# Patient Record
Sex: FEMALE
[De-identification: names, ages, dates, MRNs, and addresses within clinical notes are randomized per-mention and may not be internally consistent; named-entity substitution may affect disease eponyms.]

---

## 2018-01-09 ENCOUNTER — APPOINTMENT (OUTPATIENT)
Dept: OBGYN | Facility: CLINIC | Age: 38
End: 2018-01-09
Payer: COMMERCIAL

## 2018-01-09 VITALS
SYSTOLIC BLOOD PRESSURE: 154 MMHG | HEIGHT: 64 IN | BODY MASS INDEX: 41.66 KG/M2 | DIASTOLIC BLOOD PRESSURE: 86 MMHG | WEIGHT: 244 LBS

## 2018-01-09 DIAGNOSIS — N92.0 EXCESSIVE AND FREQUENT MENSTRUATION WITH REGULAR CYCLE: ICD-10-CM

## 2018-01-09 DIAGNOSIS — Z83.42 FAMILY HISTORY OF FAMILIAL HYPERCHOLESTEROLEMIA: ICD-10-CM

## 2018-01-09 DIAGNOSIS — Z83.3 FAMILY HISTORY OF DIABETES MELLITUS: ICD-10-CM

## 2018-01-09 PROCEDURE — 99385 PREV VISIT NEW AGE 18-39: CPT

## 2018-01-10 LAB — HPV HIGH+LOW RISK DNA PNL CVX: NOT DETECTED

## 2018-01-12 LAB — CYTOLOGY CVX/VAG DOC THIN PREP: NORMAL

## 2022-01-27 ENCOUNTER — APPOINTMENT (OUTPATIENT)
Dept: OBGYN | Facility: CLINIC | Age: 42
End: 2022-01-27
Payer: COMMERCIAL

## 2022-01-27 VITALS
WEIGHT: 242.13 LBS | SYSTOLIC BLOOD PRESSURE: 135 MMHG | DIASTOLIC BLOOD PRESSURE: 85 MMHG | BODY MASS INDEX: 41.34 KG/M2 | HEIGHT: 64 IN

## 2022-01-27 PROCEDURE — 99396 PREV VISIT EST AGE 40-64: CPT

## 2022-01-27 NOTE — HISTORY OF PRESENT ILLNESS
[Mammogramdate] : NEVER [PapSmeardate] : 2018 [Regular Cycle Intervals] : periods have been regular [Previously active] : previously active [FreeTextEntry2] : LAST ENCOUNTER >1YR AGO

## 2022-01-28 LAB — HPV HIGH+LOW RISK DNA PNL CVX: NOT DETECTED

## 2022-02-02 LAB — CYTOLOGY CVX/VAG DOC THIN PREP: NORMAL

## 2022-02-25 ENCOUNTER — NON-APPOINTMENT (OUTPATIENT)
Age: 42
End: 2022-02-25

## 2022-02-28 ENCOUNTER — APPOINTMENT (OUTPATIENT)
Dept: INTERNAL MEDICINE | Facility: CLINIC | Age: 42
End: 2022-02-28
Payer: COMMERCIAL

## 2022-02-28 VITALS
DIASTOLIC BLOOD PRESSURE: 90 MMHG | WEIGHT: 247 LBS | HEART RATE: 85 BPM | SYSTOLIC BLOOD PRESSURE: 140 MMHG | OXYGEN SATURATION: 98 % | BODY MASS INDEX: 42.17 KG/M2 | HEIGHT: 64 IN | RESPIRATION RATE: 14 BRPM

## 2022-02-28 DIAGNOSIS — R31.29 OTHER MICROSCOPIC HEMATURIA: ICD-10-CM

## 2022-02-28 DIAGNOSIS — Z82.49 FAMILY HISTORY OF ISCHEMIC HEART DISEASE AND OTHER DISEASES OF THE CIRCULATORY SYSTEM: ICD-10-CM

## 2022-02-28 DIAGNOSIS — Z78.9 OTHER SPECIFIED HEALTH STATUS: ICD-10-CM

## 2022-02-28 DIAGNOSIS — Z82.0 FAMILY HISTORY OF EPILEPSY AND OTHER DISEASES OF THE NERVOUS SYSTEM: ICD-10-CM

## 2022-02-28 DIAGNOSIS — M25.531 PAIN IN RIGHT WRIST: ICD-10-CM

## 2022-02-28 PROCEDURE — G0442 ANNUAL ALCOHOL SCREEN 15 MIN: CPT

## 2022-02-28 PROCEDURE — 99386 PREV VISIT NEW AGE 40-64: CPT | Mod: 25

## 2022-02-28 PROCEDURE — 90471 IMMUNIZATION ADMIN: CPT

## 2022-02-28 PROCEDURE — G0444 DEPRESSION SCREEN ANNUAL: CPT | Mod: 59

## 2022-02-28 PROCEDURE — 90715 TDAP VACCINE 7 YRS/> IM: CPT
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
## 2022-02-28 NOTE — HEALTH RISK ASSESSMENT
[Good] : ~his/her~  mood as  good [Never] : Never [Yes] : Yes [Monthly or less (1 pt)] : Monthly or less (1 point) [1 or 2 (0 pts)] : 1 or 2 (0 points) [No falls in past year] : Patient reported no falls in the past year [PHQ-9 Positive] : PHQ-9 Positive [I have developed a follow-up plan documented below in the note.] : I have developed a follow-up plan documented below in the note. [Audit-CScore] : 1 [de-identified] : Exercises regularly, has a Peloton at home [Patient reported PAP Smear was normal] : Patient reported PAP Smear was normal [Change in mental status noted] : No change in mental status noted [Language] : denies difficulty with language [Behavior] : denies difficulty with behavior [Learning/Retaining New Information] : denies difficulty learning/retaining new information [Handling Complex Tasks] : denies difficulty handling complex tasks [Reasoning] : denies difficulty with reasoning [Spatial Ability and Orientation] : denies difficulty with spatial ability and orientation [Employed] : employed [Single] : single [Sexually Active] : sexually active [Fully functional (bathing, dressing, toileting, transferring, walking, feeding)] : Fully functional (bathing, dressing, toileting, transferring, walking, feeding) [Fully functional (using the telephone, shopping, preparing meals, housekeeping, doing laundry, using] : Fully functional and needs no help or supervision to perform IADLs (using the telephone, shopping, preparing meals, housekeeping, doing laundry, using transportation, managing medications and managing finances) [Smoke Detector] : smoke detector [Carbon Monoxide Detector] : carbon monoxide detector [Guns at Home] : no guns at home [Seat Belt] :  uses seat belt [Sunscreen] : uses sunscreen [PapSmearDate] : 01/22 [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

## 2022-02-28 NOTE — ASSESSMENT
[FreeTextEntry1] : 1.  General health maintenance -Pap is up-to-date.  She has yet to schedule a mammogram and has a prescription through GYN.  Reminded that she should schedule this at her earliest convenience. SBIRT Screening - the patient was screened for alcohol, tobacco and substance abuse using standardized tools.  No high risk behavior was noted.  Depression screen was mildly positive but she is working with a therapist.  Needs a Tdap as her last was in 2010.  Had her COVID series with the booster but does not readily have the dates available.  Had a flu shot in October.  Uses sunscreen, seatbelts etc.\par 2.  History of microscopic hematuria -has been told it is a very small amount and has never really been worked up.  Will check a urine microscopy today.\par 3.  Increased weight -admits to stress eating.  Is interested in the medical weight management program which I referred her to.\par 4.  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  She does find this stressful as her mother is getting worse quickly.  Is working with her therapist as well.\par 5.  Labs as per plan\par 6.  Wrist pain which is mild in nature.  Recommend she try wearing the splint at night and can try Voltaren gel externally.\par 7.  Return to office pending above, 1 year for a CPE limitations in strength

## 2022-02-28 NOTE — PHYSICAL EXAM
[No Acute Distress] : no acute distress [Well Nourished] : well nourished [Well Developed] : well developed [Well-Appearing] : well-appearing [Normal Sclera/Conjunctiva] : normal sclera/conjunctiva [PERRL] : pupils equal round and reactive to light [EOMI] : extraocular movements intact [Normal Outer Ear/Nose] : the outer ears and nose were normal in appearance [No JVD] : no jugular venous distention [No Lymphadenopathy] : no lymphadenopathy [Supple] : supple [Thyroid Normal, No Nodules] : the thyroid was normal and there were no nodules present [No Respiratory Distress] : no respiratory distress  [No Accessory Muscle Use] : no accessory muscle use [Clear to Auscultation] : lungs were clear to auscultation bilaterally [Normal Rate] : normal rate  [Regular Rhythm] : with a regular rhythm [Normal S1, S2] : normal S1 and S2 [No Murmur] : no murmur heard [No Carotid Bruits] : no carotid bruits [No Abdominal Bruit] : a ~M bruit was not heard ~T in the abdomen [No Varicosities] : no varicosities [Pedal Pulses Present] : the pedal pulses are present [No Edema] : there was no peripheral edema [No Palpable Aorta] : no palpable aorta [No Extremity Clubbing/Cyanosis] : no extremity clubbing/cyanosis [Soft] : abdomen soft [Non Tender] : non-tender [Non-distended] : non-distended [No Masses] : no abdominal mass palpated [No HSM] : no HSM [Normal Bowel Sounds] : normal bowel sounds [Normal Posterior Cervical Nodes] : no posterior cervical lymphadenopathy [Normal Anterior Cervical Nodes] : no anterior cervical lymphadenopathy [No CVA Tenderness] : no CVA  tenderness [No Spinal Tenderness] : no spinal tenderness [No Joint Swelling] : no joint swelling [Grossly Normal Strength/Tone] : grossly normal strength/tone [No Rash] : no rash [Coordination Grossly Intact] : coordination grossly intact [No Focal Deficits] : no focal deficits [Normal Gait] : normal gait [Deep Tendon Reflexes (DTR)] : deep tendon reflexes were 2+ and symmetric [Normal Affect] : the affect was normal [Normal Insight/Judgement] : insight and judgment were intact [de-identified] : Deferred as just done at GYN

## 2022-03-08 LAB
25(OH)D3 SERPL-MCNC: 16.7 NG/ML
ALBUMIN SERPL ELPH-MCNC: 4.4 G/DL
ALP BLD-CCNC: 78 U/L
ALT SERPL-CCNC: 18 U/L
ANION GAP SERPL CALC-SCNC: 17 MMOL/L
APPEARANCE: ABNORMAL
APPEARANCE: ABNORMAL
AST SERPL-CCNC: 12 U/L
BACTERIA: ABNORMAL
BASOPHILS # BLD AUTO: 0.06 K/UL
BASOPHILS NFR BLD AUTO: 0.8 %
BILIRUB SERPL-MCNC: 0.2 MG/DL
BILIRUBIN URINE: NEGATIVE
BILIRUBIN URINE: NEGATIVE
BLOOD URINE: NORMAL
BLOOD URINE: NORMAL
BUN SERPL-MCNC: 10 MG/DL
CALCIUM SERPL-MCNC: 9.7 MG/DL
CHLORIDE SERPL-SCNC: 104 MMOL/L
CHOLEST SERPL-MCNC: 232 MG/DL
CO2 SERPL-SCNC: 20 MMOL/L
COLOR: NORMAL
COLOR: NORMAL
CREAT SERPL-MCNC: 0.7 MG/DL
EGFR: 111 ML/MIN/1.73M2
EOSINOPHIL # BLD AUTO: 0.12 K/UL
EOSINOPHIL NFR BLD AUTO: 1.5 %
ESTIMATED AVERAGE GLUCOSE: 114 MG/DL
GLUCOSE QUALITATIVE U: NEGATIVE
GLUCOSE QUALITATIVE U: NEGATIVE
GLUCOSE SERPL-MCNC: 110 MG/DL
HBA1C MFR BLD HPLC: 5.6 %
HCT VFR BLD CALC: 45.6 %
HDLC SERPL-MCNC: 56 MG/DL
HGB BLD-MCNC: 13.6 G/DL
HYALINE CASTS: 4 /LPF
IMM GRANULOCYTES NFR BLD AUTO: 0.3 %
KETONES URINE: NEGATIVE
KETONES URINE: NEGATIVE
LDLC SERPL CALC-MCNC: 146 MG/DL
LEUKOCYTE ESTERASE URINE: NEGATIVE
LEUKOCYTE ESTERASE URINE: NEGATIVE
LYMPHOCYTES # BLD AUTO: 1.65 K/UL
LYMPHOCYTES NFR BLD AUTO: 20.7 %
MAN DIFF?: NORMAL
MCHC RBC-ENTMCNC: 24.2 PG
MCHC RBC-ENTMCNC: 29.8 GM/DL
MCV RBC AUTO: 81 FL
MICROSCOPIC-UA: NORMAL
MONOCYTES # BLD AUTO: 0.52 K/UL
MONOCYTES NFR BLD AUTO: 6.5 %
NEUTROPHILS # BLD AUTO: 5.61 K/UL
NEUTROPHILS NFR BLD AUTO: 70.2 %
NITRITE URINE: NEGATIVE
NITRITE URINE: NEGATIVE
NONHDLC SERPL-MCNC: 176 MG/DL
PH URINE: 7
PH URINE: 7
PLATELET # BLD AUTO: 394 K/UL
POTASSIUM SERPL-SCNC: 4.6 MMOL/L
PROT SERPL-MCNC: 6.9 G/DL
PROTEIN URINE: NEGATIVE
PROTEIN URINE: NEGATIVE
RBC # BLD: 5.63 M/UL
RBC # FLD: 15.9 %
RED BLOOD CELLS URINE: 4 /HPF
SODIUM SERPL-SCNC: 142 MMOL/L
SPECIFIC GRAVITY URINE: 1.01
SPECIFIC GRAVITY URINE: 1.01
SQUAMOUS EPITHELIAL CELLS: 8 /HPF
T4 FREE SERPL-MCNC: 1.2 NG/DL
TRIGL SERPL-MCNC: 148 MG/DL
TSH SERPL-ACNC: 1.72 UIU/ML
UROBILINOGEN URINE: NORMAL
UROBILINOGEN URINE: NORMAL
WBC # FLD AUTO: 7.98 K/UL
WHITE BLOOD CELLS URINE: 4 /HPF

## 2022-04-06 ENCOUNTER — RESULT REVIEW (OUTPATIENT)
Age: 42
End: 2022-04-06

## 2022-04-06 ENCOUNTER — APPOINTMENT (OUTPATIENT)
Dept: MAMMOGRAPHY | Facility: CLINIC | Age: 42
End: 2022-04-06
Payer: COMMERCIAL

## 2022-04-06 PROCEDURE — 77067 SCR MAMMO BI INCL CAD: CPT

## 2022-04-06 PROCEDURE — 77063 BREAST TOMOSYNTHESIS BI: CPT

## 2023-04-03 ENCOUNTER — APPOINTMENT (OUTPATIENT)
Dept: INTERNAL MEDICINE | Facility: CLINIC | Age: 43
End: 2023-04-03
Payer: COMMERCIAL

## 2023-04-03 VITALS
DIASTOLIC BLOOD PRESSURE: 82 MMHG | BODY MASS INDEX: 44.9 KG/M2 | HEART RATE: 84 BPM | SYSTOLIC BLOOD PRESSURE: 122 MMHG | HEIGHT: 64 IN | OXYGEN SATURATION: 98 % | WEIGHT: 263 LBS

## 2023-04-03 DIAGNOSIS — Z63.6 DEPENDENT RELATIVE NEEDING CARE AT HOME: ICD-10-CM

## 2023-04-03 DIAGNOSIS — Z00.00 ENCOUNTER FOR GENERAL ADULT MEDICAL EXAMINATION W/OUT ABNORMAL FINDINGS: ICD-10-CM

## 2023-04-03 PROCEDURE — G0444 DEPRESSION SCREEN ANNUAL: CPT | Mod: 59

## 2023-04-03 PROCEDURE — 99396 PREV VISIT EST AGE 40-64: CPT

## 2023-04-03 PROCEDURE — G0442 ANNUAL ALCOHOL SCREEN 15 MIN: CPT

## 2023-04-03 RX ORDER — MULTIVIT-MIN/FOLIC/VIT K/LYCOP 400-300MCG
50 MCG TABLET ORAL
Qty: 90 | Refills: 0 | Status: ACTIVE | COMMUNITY
Start: 2023-04-03

## 2023-04-03 SDOH — SOCIAL STABILITY - SOCIAL INSECURITY: DEPENDENT RELATIVE NEEDING CARE AT HOME: Z63.6

## 2023-04-03 NOTE — PHYSICAL EXAM
[No Acute Distress] : no acute distress [Well Nourished] : well nourished [Well Developed] : well developed [Well-Appearing] : well-appearing [Normal Sclera/Conjunctiva] : normal sclera/conjunctiva [PERRL] : pupils equal round and reactive to light [EOMI] : extraocular movements intact [Normal Outer Ear/Nose] : the outer ears and nose were normal in appearance [No JVD] : no jugular venous distention [No Lymphadenopathy] : no lymphadenopathy [Supple] : supple [Thyroid Normal, No Nodules] : the thyroid was normal and there were no nodules present [No Respiratory Distress] : no respiratory distress  [No Accessory Muscle Use] : no accessory muscle use [Clear to Auscultation] : lungs were clear to auscultation bilaterally [Normal Rate] : normal rate  [Regular Rhythm] : with a regular rhythm [Normal S1, S2] : normal S1 and S2 [No Murmur] : no murmur heard [No Carotid Bruits] : no carotid bruits [No Abdominal Bruit] : a ~M bruit was not heard ~T in the abdomen [No Varicosities] : no varicosities [Pedal Pulses Present] : the pedal pulses are present [No Edema] : there was no peripheral edema [No Palpable Aorta] : no palpable aorta [No Extremity Clubbing/Cyanosis] : no extremity clubbing/cyanosis [Soft] : abdomen soft [Non Tender] : non-tender [Non-distended] : non-distended [No Masses] : no abdominal mass palpated [No HSM] : no HSM [Normal Bowel Sounds] : normal bowel sounds [No CVA Tenderness] : no CVA  tenderness [No Spinal Tenderness] : no spinal tenderness [No Joint Swelling] : no joint swelling [Grossly Normal Strength/Tone] : grossly normal strength/tone [No Rash] : no rash [Coordination Grossly Intact] : coordination grossly intact [No Focal Deficits] : no focal deficits [Normal Gait] : normal gait [Deep Tendon Reflexes (DTR)] : deep tendon reflexes were 2+ and symmetric [Normal Affect] : the affect was normal [Normal Insight/Judgement] : insight and judgment were intact [de-identified] : B/L cerumen occuding the TM's [de-identified] : Deferred as just done at GYN
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
## 2023-04-03 NOTE — HEALTH RISK ASSESSMENT
[Good] : ~his/her~  mood as  good [Yes] : Yes [Monthly or less (1 pt)] : Monthly or less (1 point) [1 or 2 (0 pts)] : 1 or 2 (0 points) [Employed] : employed [Single] : single [Sexually Active] : sexually active [Fully functional (bathing, dressing, toileting, transferring, walking, feeding)] : Fully functional (bathing, dressing, toileting, transferring, walking, feeding) [Fully functional (using the telephone, shopping, preparing meals, housekeeping, doing laundry, using] : Fully functional and needs no help or supervision to perform IADLs (using the telephone, shopping, preparing meals, housekeeping, doing laundry, using transportation, managing medications and managing finances) [Smoke Detector] : smoke detector [Carbon Monoxide Detector] : carbon monoxide detector [Seat Belt] :  uses seat belt [Sunscreen] : uses sunscreen [With Patient/Caregiver] : , with patient/caregiver [Several Days (1)] : 6.) Feeling bad about yourself, or that you are a failure, or have let yourself or your family down? Several days [Not at All (0)] : 8.) Moving or speaking so slowly that other people could have noticed, or the opposite, moving or speaking faster than usual? Not at all [Not at all] : How difficult have these problems made it for you to do your work, take care of things at home, or get along with people? Not at all [PHQ-9 Negative - No further assessment needed] : PHQ-9 Negative - No further assessment needed [Audit-CScore] : 1 [de-identified] : walking 2x/week [[id number]] : 4 [Change in mental status noted] : No change in mental status noted [Language] : denies difficulty with language [Behavior] : denies difficulty with behavior [Learning/Retaining New Information] : denies difficulty learning/retaining new information [Handling Complex Tasks] : denies difficulty handling complex tasks [Reasoning] : denies difficulty with reasoning [Spatial Ability and Orientation] : denies difficulty with spatial ability and orientation [Guns at Home] : no guns at home [MammogramDate] : 04/22 [MammogramComments] : Birads-1.  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

## 2023-04-03 NOTE — ASSESSMENT
[FreeTextEntry1] : 1.  General health maintenance -Pap and mammo are up-to-date.  SBIRT Screening - the patient was screened for alcohol, tobacco and substance abuse using standardized tools.  No high risk behavior was noted.  5 minutes were spent scoring and discussing.  PHQ-9 was administered - score 4.  5 minutes were spent administering, scoring and discussing needs.   TdaP is UTD.  Had her COVID series with the boosters but does not readily have the dates available.  Had a flu shot in October.  Uses sunscreen, seatbelts etc.\par 2.  History of microscopic hematuria -urine last year was reviewed and was negative.  No c/o hematuria at this time\par 3.  Increased weight -admits to stress eating.  Is interested in the medical weight management program which I referred her to.  To increase her exercise as well.\par 4.  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  She does find this stressful as her mother is getting worse quickly.  Referred to our behavioral health program.\par 5.  Labs as per plan\par 6.  Return to office 1 year for a CPE

## 2023-04-15 LAB
25(OH)D3 SERPL-MCNC: 29.2 NG/ML
ALBUMIN SERPL ELPH-MCNC: 4.3 G/DL
ALP BLD-CCNC: 89 U/L
ALT SERPL-CCNC: 27 U/L
ANION GAP SERPL CALC-SCNC: 13 MMOL/L
AST SERPL-CCNC: 14 U/L
BASOPHILS # BLD AUTO: 0.06 K/UL
BASOPHILS NFR BLD AUTO: 0.9 %
BILIRUB SERPL-MCNC: 0.2 MG/DL
BUN SERPL-MCNC: 16 MG/DL
CALCIUM SERPL-MCNC: 9.6 MG/DL
CHLORIDE SERPL-SCNC: 100 MMOL/L
CHOLEST SERPL-MCNC: 268 MG/DL
CO2 SERPL-SCNC: 26 MMOL/L
CREAT SERPL-MCNC: 0.71 MG/DL
EGFR: 109 ML/MIN/1.73M2
EOSINOPHIL # BLD AUTO: 0.25 K/UL
EOSINOPHIL NFR BLD AUTO: 3.6 %
ESTIMATED AVERAGE GLUCOSE: 123 MG/DL
GLUCOSE SERPL-MCNC: 120 MG/DL
HBA1C MFR BLD HPLC: 5.9 %
HCT VFR BLD CALC: 45.2 %
HDLC SERPL-MCNC: 54 MG/DL
HGB BLD-MCNC: 13.9 G/DL
IMM GRANULOCYTES NFR BLD AUTO: 0.4 %
LDLC SERPL CALC-MCNC: 179 MG/DL
LYMPHOCYTES # BLD AUTO: 1.86 K/UL
LYMPHOCYTES NFR BLD AUTO: 26.8 %
MAN DIFF?: NORMAL
MCHC RBC-ENTMCNC: 24.3 PG
MCHC RBC-ENTMCNC: 30.8 GM/DL
MCV RBC AUTO: 78.9 FL
MONOCYTES # BLD AUTO: 0.47 K/UL
MONOCYTES NFR BLD AUTO: 6.8 %
NEUTROPHILS # BLD AUTO: 4.28 K/UL
NEUTROPHILS NFR BLD AUTO: 61.5 %
NONHDLC SERPL-MCNC: 214 MG/DL
PLATELET # BLD AUTO: 411 K/UL
POTASSIUM SERPL-SCNC: 4.5 MMOL/L
PROT SERPL-MCNC: 6.9 G/DL
RBC # BLD: 5.73 M/UL
RBC # FLD: 16.2 %
SODIUM SERPL-SCNC: 139 MMOL/L
T4 FREE SERPL-MCNC: 1.1 NG/DL
TRIGL SERPL-MCNC: 178 MG/DL
TSH SERPL-ACNC: 2.48 UIU/ML
WBC # FLD AUTO: 6.95 K/UL

## 2023-04-18 ENCOUNTER — APPOINTMENT (OUTPATIENT)
Dept: OBGYN | Facility: CLINIC | Age: 43
End: 2023-04-18
Payer: COMMERCIAL

## 2023-04-18 VITALS
SYSTOLIC BLOOD PRESSURE: 145 MMHG | WEIGHT: 265.13 LBS | BODY MASS INDEX: 45.26 KG/M2 | HEIGHT: 64 IN | DIASTOLIC BLOOD PRESSURE: 78 MMHG

## 2023-04-18 PROCEDURE — 99396 PREV VISIT EST AGE 40-64: CPT

## 2023-05-11 ENCOUNTER — RESULT REVIEW (OUTPATIENT)
Age: 43
End: 2023-05-11

## 2023-05-11 ENCOUNTER — APPOINTMENT (OUTPATIENT)
Dept: MAMMOGRAPHY | Facility: CLINIC | Age: 43
End: 2023-05-11
Payer: COMMERCIAL

## 2023-05-11 PROCEDURE — 77067 SCR MAMMO BI INCL CAD: CPT

## 2023-05-11 PROCEDURE — 77063 BREAST TOMOSYNTHESIS BI: CPT

## 2023-10-18 ENCOUNTER — OUTPATIENT (OUTPATIENT)
Dept: OUTPATIENT SERVICES | Facility: HOSPITAL | Age: 43
LOS: 1 days | End: 2023-10-18
Payer: COMMERCIAL

## 2023-10-18 ENCOUNTER — APPOINTMENT (OUTPATIENT)
Dept: BARIATRICS/WEIGHT MGMT | Facility: CLINIC | Age: 43
End: 2023-10-18
Payer: COMMERCIAL

## 2023-10-18 VITALS — BODY MASS INDEX: 47.12 KG/M2 | HEIGHT: 64 IN | WEIGHT: 276 LBS

## 2023-10-18 DIAGNOSIS — Z13.39 ENCOUNTER FOR SCREENING EXAM FOR OTHER MENTAL HEALTH AND BEHAVIORAL DISORDERS: ICD-10-CM

## 2023-10-18 DIAGNOSIS — Z13.31 ENCOUNTER FOR SCREENING FOR DEPRESSION: ICD-10-CM

## 2023-10-18 DIAGNOSIS — E78.2 MIXED HYPERLIPIDEMIA: ICD-10-CM

## 2023-10-18 DIAGNOSIS — R63.2 POLYPHAGIA: ICD-10-CM

## 2023-10-18 DIAGNOSIS — I10 ESSENTIAL (PRIMARY) HYPERTENSION: ICD-10-CM

## 2023-10-18 DIAGNOSIS — R73.03 PREDIABETES: ICD-10-CM

## 2023-10-18 DIAGNOSIS — E66.01 MORBID (SEVERE) OBESITY DUE TO EXCESS CALORIES: ICD-10-CM

## 2023-10-18 DIAGNOSIS — Z23 ENCOUNTER FOR IMMUNIZATION: ICD-10-CM

## 2023-10-18 PROCEDURE — G0463: CPT

## 2023-10-18 PROCEDURE — 99205 OFFICE O/P NEW HI 60 MIN: CPT | Mod: 95

## 2023-11-01 ENCOUNTER — APPOINTMENT (OUTPATIENT)
Dept: BARIATRICS/WEIGHT MGMT | Facility: CLINIC | Age: 43
End: 2023-11-01
Payer: COMMERCIAL

## 2023-11-01 PROCEDURE — 90791 PSYCH DIAGNOSTIC EVALUATION: CPT | Mod: GT,95

## 2023-11-21 ENCOUNTER — APPOINTMENT (OUTPATIENT)
Dept: BARIATRICS/WEIGHT MGMT | Facility: CLINIC | Age: 43
End: 2023-11-21
Payer: COMMERCIAL

## 2023-11-21 PROCEDURE — 90832 PSYTX W PT 30 MINUTES: CPT | Mod: 95,GT

## 2023-12-14 ENCOUNTER — APPOINTMENT (OUTPATIENT)
Dept: BARIATRICS/WEIGHT MGMT | Facility: CLINIC | Age: 43
End: 2023-12-14
Payer: COMMERCIAL

## 2023-12-14 VITALS — BODY MASS INDEX: 45.49 KG/M2 | WEIGHT: 265 LBS

## 2023-12-14 DIAGNOSIS — E55.9 VITAMIN D DEFICIENCY, UNSPECIFIED: ICD-10-CM

## 2023-12-14 DIAGNOSIS — R63.2 POLYPHAGIA: ICD-10-CM

## 2023-12-14 PROCEDURE — 99214 OFFICE O/P EST MOD 30 MIN: CPT | Mod: 95

## 2023-12-14 RX ORDER — LIRAGLUTIDE 6 MG/ML
18 INJECTION, SOLUTION SUBCUTANEOUS DAILY
Qty: 4 | Refills: 0 | Status: DISCONTINUED | COMMUNITY
Start: 2023-10-18 | End: 2023-12-14

## 2023-12-14 RX ORDER — METFORMIN HYDROCHLORIDE 500 MG/1
500 TABLET, COATED ORAL
Qty: 180 | Refills: 0 | Status: ACTIVE | COMMUNITY
Start: 2023-10-18 | End: 1900-01-01

## 2023-12-14 NOTE — HISTORY OF PRESENT ILLNESS
[Often] : 4. Within the past 3 months, during your episodes of excessive overeating, how often did you continue eating even though you were not hungry? Often [Sometimes] : 6. Within the past 3 months, during your episodes of excessive overeating, how often did you feel disgusted with yourself or guilty afterward? Sometimes [Never or Rarely] : 7. Within the past 3 months, during your episodes of excessive overeating, how often did you make yourself vomit as a means to control your weight or shape? Never or Rarely [] : 2. Do you feel distressed about your episodes of excessive overeating?  No [FreeTextEntry1] : Bariatric surgery history: none Obesity co-morbidities: predm, hld Comorbidities improved or resolved: none Anti-obesity medications: metformin Obesity medication side effects: none  PATIENT WAS NOTIFIED THAT ANYTHING WE DISCUSSED IN OUR MEETING TODAY MAY BE REFLECTED IN WRITING IN THE VISIT NOTE WHICH WILL BE AVAILABLE TO OTHER MEDICAL PROVIDERS TO REVIEW AS PART OF ROUTINE PATIENT CARE.  PATIENT VERBALLY AGREED.   Ms. MILENA DYER is a 42 year year old female who presents for evaluation and treatment of Class 3 obesity.   Obesity related co-morbidities: predm, hld Father has DM.   Patient lives - alone.  caregiver for mom so is at Moms often.  all 7 nights spends w her Mom Employment status - marketing  Weight History: Lowest adult weight: 195 Highest adult weight: 276  interim: - has been working w Dr. Mcqueen on healthier eating habits, recognizing fullness and she has been doing better w that - she has lost 10# in past 2 months.  we are wanting to continue to focus on mental health and support her progress - she is confident she is working toward a healthier place mentally regarding eating habits - she would like to see if zepbound is available early next year.  Side effects reviewed, patient did not have further questions. - taking metformin, not significant negative side effects.  helping w sugar cravings  Has gained 10-20 pounds over the past year.  Obesity began in childhood - middle school.  Weight gain has occurred with: In high school, had significant weight gain .   In college, started to see therapist and RDN - lost 40-50 lbs.  Before covid, was active for her job, and was at a healthier weight, was doing Noom around 220#.   Mom diagnosed with alzheimers in Dec 2019.  post covid has had significant weight gain.  +emotional, boredom eating. h/o eating disorder - binge eating.  Always been athlete - did tennis, kickboxing, [employer] before covid  Past weight loss attempts include:  RDN visits, exercise, Noom. These have produced a maximum of 40-50 pounds of weight loss.   Anti-obesity medications in the past: none  Sleep: 8 hours. sleeps well. no snoring.   Has 2 regular meals a day.  breakfast, lunch  Diet history: wakes up at: 730am B: 830- coffee, small bagel w cream cheese and lox, or sandwich w prosciutto and cheese L: 12-1 cooks at home.  protein, w pasta or rice. or tuna fish sandwich.  Or chicken sandwich, or salad.  chicken and rice, or steak and rice.  with broccoli or spinach.  Or egg sandwich   snack - fruit - bananas or apples.  popcorn or pretzels.  likes veggies.   D: 530-6pm - repeat of lunch stuff.  sometimes take out.    snacks: throughout the day eating after dinner: no  overeating episodes: yes - 3x a week.  >1.5 year and a half.    Sodas/fast food/processed foods: used to order a lot but now has food at Moms and her place. these days 0-1 days a week.    Water intake per day: 6-8+ cup per day. enjoys water coffee 2-4 cups.  black.   tea 1 cup  Physical activity: Patient enjoys: walking Current physical activity: daily activities/walking  has peloton.  could do peloton. at her house, could do 10-15 minutes   In the past, talking to friends, read/write, focusing on work, peloton - helped w stress.

## 2023-12-14 NOTE — ASSESSMENT
[FreeTextEntry1] : 42 y.o F with Class 3 obesity, preDM, HLD, presents for weight management  - will send zepbound in early jan. Side effects reviewed, patient did not have further questions. - start metformin 500mg bid - saxenda out of stock - not interested in soham surg consult at this time, will re-assess in future - discussed incr veg at meals, high fiber carbs, and low sat fat protein choices - plant based - psychologist Dr. reynolds - h/o eating disorder, binge eating, caregiver stress - she wants to start doing peloton  f/u 6-8 weeks. - discussed transition to new provider

## 2023-12-21 ENCOUNTER — APPOINTMENT (OUTPATIENT)
Dept: BARIATRICS/WEIGHT MGMT | Facility: CLINIC | Age: 43
End: 2023-12-21
Payer: COMMERCIAL

## 2023-12-21 PROCEDURE — 90832 PSYTX W PT 30 MINUTES: CPT | Mod: GT,95

## 2023-12-21 NOTE — REASON FOR VISIT
[Follow-Up Visit] : a follow-up visit for [Morbid Obesity (BMI>40)] : morbid obesity (bmi>40) [Home] : at home, [unfilled] , at the time of the visit. [Other Location: e.g. Home (Enter Location, City,State)___] : at [unfilled] [Patient] : the patient [Referring By:  ___] : ~[address]~ was referred for psychological evaluation by Dr. VASQUEZ [de-identified] : for evaluation and tx of psyc sxs related to obesity [de-identified] : Confidentiality and its limitations were explained.

## 2023-12-21 NOTE — HISTORY OF PRESENT ILLNESS
[de-identified] : Pt's motivation for weight loss is to "feel healthy and comfortable."   Considered about developing T2DM given paternal hx.  Reports gaining 40lbs x past 2 yrs. Per pt, her highest weight was 280-290 during high school and her lowest weight was 185-190 lbs in her early 20s.  She denies having a specific goal weight.

## 2023-12-21 NOTE — DISCUSSION/SUMMARY
[Conventional grooming and hygiene] : Conventional grooming and hygiene [Calm, cooperative] : Calm, cooperative [No unusual behaviors, movements, etc.] : No unusual behaviors, movements, etc. [Normal Rate/Tone/Volume] : Normal Rate/Tone/Volume [Normal Range] : Normal Range [Euthymic] : Euthymic [Logical, Goal Directed] : Logical, Goal Directed [Behavior plan developed] : Behavior plan developed [Addtnl Health & Behavior Intervention: Individual] : Additional Health and Behavior Intervention: Individual [Addtnl Health & Behavior Intervention: Group] : Additional Health and Behavior Intervention: Group [Coping skills training to overcome social/emotional barriers to adherence] : Coping skills training to overcome social/emotional barriers to adherence [de-identified] : good [de-identified] : good [de-identified] : emotional and mindless eating [FreeTextEntry1] : Zulema is a 42 yr old female referred by Dr. Capellan for behavioral weight loss counseling.  She endorses emotional and mindless eating with overeating episodes that do not meet criteria for binge eating due to quantity of food; however, she endorses loss of control, eating rapidly, eating when not physically hungry and eating until uncomfortably full.  Previously in therapy with Josiane Enrique LCSW for tx of disordered eating.  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   [de-identified] : UFED   [de-identified] : self monitor food intake and contextual factors increase awareness of triggers for overeating

## 2024-01-29 ENCOUNTER — APPOINTMENT (OUTPATIENT)
Dept: BARIATRICS/WEIGHT MGMT | Facility: CLINIC | Age: 44
End: 2024-01-29
Payer: COMMERCIAL

## 2024-01-29 VITALS — BODY MASS INDEX: 46.78 KG/M2 | HEIGHT: 64 IN | WEIGHT: 274 LBS

## 2024-01-29 PROCEDURE — 90832 PSYTX W PT 30 MINUTES: CPT | Mod: GT,95

## 2024-01-29 NOTE — DISCUSSION/SUMMARY
[Conventional grooming and hygiene] : Conventional grooming and hygiene [Calm, cooperative] : Calm, cooperative [No unusual behaviors, movements, etc.] : No unusual behaviors, movements, etc. [Normal Rate/Tone/Volume] : Normal Rate/Tone/Volume [Normal Range] : Normal Range [Euthymic] : Euthymic [Logical, Goal Directed] : Logical, Goal Directed [Behavior plan developed] : Behavior plan developed [Addtnl Health & Behavior Intervention: Individual] : Additional Health and Behavior Intervention: Individual [Addtnl Health & Behavior Intervention: Group] : Additional Health and Behavior Intervention: Group [Coping skills training to overcome social/emotional barriers to adherence] : Coping skills training to overcome social/emotional barriers to adherence [de-identified] : good [de-identified] : poor while traveling  [de-identified] : emotional and mindless eating [FreeTextEntry1] : Zulema is a 42 yr old female referred by Dr. Capellan for behavioral weight loss counseling.  She endorses emotional and mindless eating with overeating episodes that do not meet criteria for binge eating due to quantity of food; however, she endorses loss of control, eating rapidly, eating when not physically hungry and eating until uncomfortably full.  Previously in therapy with Josiane Enrique LCSW for tx of disordered eating.  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   [de-identified] : UFED   [de-identified] : self monitor food intake and contextual factors increase awareness of triggers for overeating

## 2024-01-29 NOTE — HISTORY OF PRESENT ILLNESS
[de-identified] : Pt's motivation for weight loss is to "feel healthy and comfortable."   Considered about developing T2DM given paternal hx.  Reports gaining 40lbs x past 2 yrs. Per pt, her highest weight was 280-290 during high school and her lowest weight was 185-190 lbs in her early 20s.  She denies having a specific goal weight.

## 2024-02-22 ENCOUNTER — APPOINTMENT (OUTPATIENT)
Dept: BARIATRICS/WEIGHT MGMT | Facility: CLINIC | Age: 44
End: 2024-02-22
Payer: COMMERCIAL

## 2024-02-22 VITALS — HEIGHT: 64 IN | WEIGHT: 270 LBS | BODY MASS INDEX: 46.1 KG/M2

## 2024-02-22 DIAGNOSIS — E78.2 MIXED HYPERLIPIDEMIA: ICD-10-CM

## 2024-02-22 DIAGNOSIS — E66.01 MORBID (SEVERE) OBESITY DUE TO EXCESS CALORIES: ICD-10-CM

## 2024-02-22 DIAGNOSIS — R73.03 PREDIABETES.: ICD-10-CM

## 2024-02-22 PROCEDURE — 99213 OFFICE O/P EST LOW 20 MIN: CPT | Mod: 95

## 2024-02-22 NOTE — HISTORY OF PRESENT ILLNESS
[FreeTextEntry1] : This is a 43 year old female  being seen obesity followup visit.   Patient is down 4 pounds  Taking metformin 1000mg, has not started zepbound yet. 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

## 2024-02-22 NOTE — ASSESSMENT
[FreeTextEntry1] : Bariatric surgery history: none Obesity co-morbidities: predm, hld Comorbidities improved or resolved: none Anti-obesity medications: metformin Obesity medication side effects: none  Plan: Focus on plant based, whole foods avoid package processed foods keep food journal  will send zepbound script to [facility] so she can get coupon applied  encouraged increase physical activity  f/u 4-6 weeks

## 2024-02-27 ENCOUNTER — APPOINTMENT (OUTPATIENT)
Dept: BARIATRICS/WEIGHT MGMT | Facility: CLINIC | Age: 44
End: 2024-02-27
Payer: COMMERCIAL

## 2024-02-27 PROCEDURE — 90832 PSYTX W PT 30 MINUTES: CPT | Mod: 95,GT

## 2024-02-27 NOTE — REASON FOR VISIT
[Follow-Up Visit] : a follow-up visit for [Morbid Obesity (BMI>40)] : morbid obesity (bmi>40) [Home] : at home, [unfilled] , at the time of the visit. [Other Location: e.g. Home (Enter Location, City,State)___] : at [unfilled] [Patient] : the patient [Referring By:  ___] : ~[address]~ was referred for psychological evaluation by Dr. VASQUEZ [de-identified] : for evaluation and tx of psyc sxs related to obesity [de-identified] : Confidentiality and its limitations were explained.

## 2024-02-27 NOTE — DISCUSSION/SUMMARY
RN called patient again to f/u on reported symptoms. Patient did not answer and vm left with request for call back. RN to follow in the am   [Conventional grooming and hygiene] : Conventional grooming and hygiene [Calm, cooperative] : Calm, cooperative [No unusual behaviors, movements, etc.] : No unusual behaviors, movements, etc. [Normal Rate/Tone/Volume] : Normal Rate/Tone/Volume [Normal Range] : Normal Range [Euthymic] : Euthymic [Logical, Goal Directed] : Logical, Goal Directed [de-identified] : good [de-identified] : good [de-identified] : emotional and mindless eating [Behavior plan developed] : Behavior plan developed [Addtnl Health & Behavior Intervention: Individual] : Additional Health and Behavior Intervention: Individual [Addtnl Health & Behavior Intervention: Group] : Additional Health and Behavior Intervention: Group [Coping skills training to overcome social/emotional barriers to adherence] : Coping skills training to overcome social/emotional barriers to adherence [FreeTextEntry1] : Zulema is a 42 yr old female referred by Dr. Capellan for behavioral weight loss counseling.  She endorses emotional and mindless eating with overeating episodes that do not meet criteria for binge eating due to quantity of food; however, she endorses loss of control, eating rapidly, eating when not physically hungry and eating until uncomfortably full.  Previously in therapy with Josiane Enrique LCSW for tx of disordered eating.  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   [de-identified] : UFED   [de-identified] : self monitor food intake and contextual factors increase awareness of triggers for overeating

## 2024-02-27 NOTE — HISTORY OF PRESENT ILLNESS
[de-identified] : Pt's motivation for weight loss is to "feel healthy and comfortable."   Considered about developing T2DM given paternal hx.  Reports gaining 40lbs x past 2 yrs. Per pt, her highest weight was 280-290 during high school and her lowest weight was 185-190 lbs in her early 20s.  She denies having a specific goal weight.

## 2024-03-27 ENCOUNTER — APPOINTMENT (OUTPATIENT)
Dept: BARIATRICS/WEIGHT MGMT | Facility: CLINIC | Age: 44
End: 2024-03-27
Payer: COMMERCIAL

## 2024-03-27 VITALS — BODY MASS INDEX: 45.41 KG/M2 | WEIGHT: 266 LBS | HEIGHT: 64 IN

## 2024-03-27 PROCEDURE — 90832 PSYTX W PT 30 MINUTES: CPT | Mod: 95,GT

## 2024-03-27 NOTE — DISCUSSION/SUMMARY
[Conventional grooming and hygiene] : Conventional grooming and hygiene [Calm, cooperative] : Calm, cooperative [No unusual behaviors, movements, etc.] : No unusual behaviors, movements, etc. [Normal Rate/Tone/Volume] : Normal Rate/Tone/Volume [Normal Range] : Normal Range [Euthymic] : Euthymic [Logical, Goal Directed] : Logical, Goal Directed [Behavior plan developed] : Behavior plan developed [Addtnl Health & Behavior Intervention: Individual] : Additional Health and Behavior Intervention: Individual [Addtnl Health & Behavior Intervention: Group] : Additional Health and Behavior Intervention: Group [Coping skills training to overcome social/emotional barriers to adherence] : Coping skills training to overcome social/emotional barriers to adherence [de-identified] : good [de-identified] : excellent [de-identified] : hx of emotional and mindless eating [FreeTextEntry1] : Zulema is a 42 yr old female referred by Dr. Capellan for behavioral weight loss counseling.  She endorses emotional and mindless eating with overeating episodes that do not meet criteria for binge eating due to quantity of food; however, she endorses loss of control, eating rapidly, eating when not physically hungry and eating until uncomfortably full.  Previously in therapy with Josiane Enrique LCSW for tx of disordered eating.  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   [de-identified] : UFED   [de-identified] : self monitor food intake and contextual factors increase awareness of triggers for overeating

## 2024-03-27 NOTE — REASON FOR VISIT
[Follow-Up Visit] : a follow-up visit for [Morbid Obesity (BMI>40)] : morbid obesity (bmi>40) [Home] : at home, [unfilled] , at the time of the visit. [Other Location: e.g. Home (Enter Location, City,State)___] : at [unfilled] [Patient] : the patient [Referring By:  ___] : ~[address]~ was referred for psychological evaluation by Dr. VASQUEZ [de-identified] : for evaluation and tx of psyc sxs related to obesity [de-identified] : Confidentiality and its limitations were explained.

## 2024-03-27 NOTE — HISTORY OF PRESENT ILLNESS
[de-identified] : Pt's motivation for weight loss is to "feel healthy and comfortable."   Considered about developing T2DM given paternal hx.  Reports gaining 40lbs x past 2 yrs. Per pt, her highest weight was 280-290 during high school and her lowest weight was 185-190 lbs in her early 20s.  She denies having a specific goal weight.

## 2024-04-09 ENCOUNTER — OUTPATIENT (OUTPATIENT)
Dept: OUTPATIENT SERVICES | Facility: HOSPITAL | Age: 44
LOS: 1 days | End: 2024-04-09
Payer: COMMERCIAL

## 2024-04-09 ENCOUNTER — APPOINTMENT (OUTPATIENT)
Dept: BARIATRICS/WEIGHT MGMT | Facility: CLINIC | Age: 44
End: 2024-04-09
Payer: COMMERCIAL

## 2024-04-09 VITALS — HEIGHT: 64 IN | BODY MASS INDEX: 45.75 KG/M2 | WEIGHT: 268 LBS

## 2024-04-09 DIAGNOSIS — I10 ESSENTIAL (PRIMARY) HYPERTENSION: ICD-10-CM

## 2024-04-09 PROCEDURE — 99213 OFFICE O/P EST LOW 20 MIN: CPT | Mod: 95

## 2024-04-09 PROCEDURE — G0463: CPT

## 2024-04-09 NOTE — ASSESSMENT
[FreeTextEntry1] : Bariatric surgery history: none Obesity co-morbidities: predm, hld Comorbidities improved or resolved: none Anti-obesity medications: metformin Obesity medication side effects: none  Plan: continue current eating plan  prepping in advance  recommend food journal  continue zepbound 2.5mg for now. when higher doses are available could increase dose  encouraged increase physical activity f/u with dr Mcqueen   f/u 4-6 weeks

## 2024-04-09 NOTE — HISTORY OF PRESENT ILLNESS
[FreeTextEntry1] : This is a 43 year old female  being seen obesity followup visit.   Patient is down 4 pounds  Taking metformin 1000mg,  She  started zepbound 2.5mg - tolerating well. 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

## 2024-04-15 DIAGNOSIS — E66.01 MORBID (SEVERE) OBESITY DUE TO EXCESS CALORIES: ICD-10-CM

## 2024-04-15 DIAGNOSIS — E78.2 MIXED HYPERLIPIDEMIA: ICD-10-CM

## 2024-04-15 DIAGNOSIS — R73.03 PREDIABETES: ICD-10-CM

## 2024-04-19 ENCOUNTER — APPOINTMENT (OUTPATIENT)
Dept: OBGYN | Facility: CLINIC | Age: 44
End: 2024-04-19
Payer: COMMERCIAL

## 2024-04-19 VITALS
BODY MASS INDEX: 46.18 KG/M2 | SYSTOLIC BLOOD PRESSURE: 134 MMHG | WEIGHT: 270.5 LBS | DIASTOLIC BLOOD PRESSURE: 86 MMHG | HEIGHT: 64 IN

## 2024-04-19 DIAGNOSIS — Z01.419 ENCOUNTER FOR GYNECOLOGICAL EXAMINATION (GENERAL) (ROUTINE) W/OUT ABNORMAL FINDINGS: ICD-10-CM

## 2024-04-19 PROCEDURE — 99396 PREV VISIT EST AGE 40-64: CPT

## 2024-04-24 LAB
25(OH)D3 SERPL-MCNC: 21.3 NG/ML
ALBUMIN SERPL ELPH-MCNC: 4.2 G/DL
ALP BLD-CCNC: 95 U/L
ALT SERPL-CCNC: 28 U/L
ANION GAP SERPL CALC-SCNC: 13 MMOL/L
AST SERPL-CCNC: 15 U/L
BILIRUB SERPL-MCNC: 0.2 MG/DL
BUN SERPL-MCNC: 13 MG/DL
CALCIUM SERPL-MCNC: 9.6 MG/DL
CHLORIDE SERPL-SCNC: 103 MMOL/L
CHOLEST SERPL-MCNC: 254 MG/DL
CO2 SERPL-SCNC: 23 MMOL/L
CREAT SERPL-MCNC: 0.75 MG/DL
CRP SERPL HS-MCNC: 27.37 MG/L
EGFR: 101 ML/MIN/1.73M2
ESTIMATED AVERAGE GLUCOSE: 120 MG/DL
GLUCOSE SERPL-MCNC: 115 MG/DL
HBA1C MFR BLD HPLC: 5.8 %
HDLC SERPL-MCNC: 45 MG/DL
INSULIN P FAST SERPL-ACNC: 44.2 UU/ML
LDLC SERPL CALC-MCNC: 181 MG/DL
NONHDLC SERPL-MCNC: 210 MG/DL
POTASSIUM SERPL-SCNC: 4.4 MMOL/L
PROT SERPL-MCNC: 6.8 G/DL
SODIUM SERPL-SCNC: 139 MMOL/L
T3FREE SERPL-MCNC: 3.35 PG/ML
T4 FREE SERPL-MCNC: 1.1 NG/DL
TRIGL SERPL-MCNC: 157 MG/DL
TSH SERPL-ACNC: 2.86 UIU/ML

## 2024-04-24 RX ORDER — TIRZEPATIDE 2.5 MG/.5ML
2.5 INJECTION, SOLUTION SUBCUTANEOUS
Qty: 4 | Refills: 5 | Status: ACTIVE | COMMUNITY
Start: 2023-12-14 | End: 1900-01-01

## 2024-05-09 ENCOUNTER — APPOINTMENT (OUTPATIENT)
Dept: BARIATRICS/WEIGHT MGMT | Facility: CLINIC | Age: 44
End: 2024-05-09
Payer: COMMERCIAL

## 2024-05-09 VITALS — WEIGHT: 264 LBS | BODY MASS INDEX: 45.07 KG/M2 | HEIGHT: 64 IN

## 2024-05-09 PROCEDURE — 90832 PSYTX W PT 30 MINUTES: CPT | Mod: GT,95

## 2024-05-09 NOTE — DISCUSSION/SUMMARY
[Conventional grooming and hygiene] : Conventional grooming and hygiene [Calm, cooperative] : Calm, cooperative [No unusual behaviors, movements, etc.] : No unusual behaviors, movements, etc. [Normal Rate/Tone/Volume] : Normal Rate/Tone/Volume [Normal Range] : Normal Range [Euthymic] : Euthymic [Logical, Goal Directed] : Logical, Goal Directed [Behavior plan developed] : Behavior plan developed [Addtnl Health & Behavior Intervention: Individual] : Additional Health and Behavior Intervention: Individual [Addtnl Health & Behavior Intervention: Group] : Additional Health and Behavior Intervention: Group [Coping skills training to overcome social/emotional barriers to adherence] : Coping skills training to overcome social/emotional barriers to adherence [de-identified] : good [de-identified] : excellent [de-identified] : hx of emotional and mindless eating [FreeTextEntry1] : Zulema is a 42 yr old female referred by Dr. Capellan for behavioral weight loss counseling.  She endorses emotional and mindless eating with overeating episodes that do not meet criteria for binge eating due to quantity of food; however, she endorses loss of control, eating rapidly, eating when not physically hungry and eating until uncomfortably full.  Previously in therapy with Josiane Enrqiue LCSW for tx of disordered eating.  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   [de-identified] : UFED   [de-identified] : self monitor food intake and contextual factors increase awareness of triggers for overeating

## 2024-05-09 NOTE — REASON FOR VISIT
[Follow-Up Visit] : a follow-up visit for [Morbid Obesity (BMI>40)] : morbid obesity (bmi>40) [Home] : at home, [unfilled] , at the time of the visit. [Other Location: e.g. Home (Enter Location, City,State)___] : at [unfilled] [Patient] : the patient [Referring By:  ___] : ~[address]~ was referred for psychological evaluation by Dr. VASQUEZ [de-identified] : for evaluation and tx of psyc sxs related to obesity [de-identified] : Confidentiality and its limitations were explained.

## 2024-05-09 NOTE — HISTORY OF PRESENT ILLNESS
[de-identified] : Pt's motivation for weight loss is to "feel healthy and comfortable."   Concerned about developing T2DM given paternal hx.  Reports gaining 40lbs x past 2 yrs. Per pt, her highest weight was 280-290 during high school and her lowest weight was 185-190 lbs in her early 20s.  She denies having a specific goal weight.

## 2024-06-06 ENCOUNTER — APPOINTMENT (OUTPATIENT)
Dept: MAMMOGRAPHY | Facility: IMAGING CENTER | Age: 44
End: 2024-06-06
Payer: COMMERCIAL

## 2024-06-06 ENCOUNTER — OUTPATIENT (OUTPATIENT)
Dept: OUTPATIENT SERVICES | Facility: HOSPITAL | Age: 44
LOS: 1 days | End: 2024-06-06
Payer: COMMERCIAL

## 2024-06-06 ENCOUNTER — RESULT REVIEW (OUTPATIENT)
Age: 44
End: 2024-06-06

## 2024-06-06 DIAGNOSIS — Z01.419 ENCOUNTER FOR GYNECOLOGICAL EXAMINATION (GENERAL) (ROUTINE) WITHOUT ABNORMAL FINDINGS: ICD-10-CM

## 2024-06-06 PROCEDURE — 77063 BREAST TOMOSYNTHESIS BI: CPT

## 2024-06-06 PROCEDURE — 77063 BREAST TOMOSYNTHESIS BI: CPT | Mod: 26

## 2024-06-06 PROCEDURE — 77067 SCR MAMMO BI INCL CAD: CPT | Mod: 26

## 2024-06-06 PROCEDURE — 77067 SCR MAMMO BI INCL CAD: CPT

## 2024-06-10 ENCOUNTER — RESULT REVIEW (OUTPATIENT)
Age: 44
End: 2024-06-10

## 2024-06-10 DIAGNOSIS — R92.2 INCONCLUSIVE MAMMOGRAM: ICD-10-CM

## 2024-06-11 ENCOUNTER — OUTPATIENT (OUTPATIENT)
Dept: OUTPATIENT SERVICES | Facility: HOSPITAL | Age: 44
LOS: 1 days | End: 2024-06-11
Payer: COMMERCIAL

## 2024-06-11 ENCOUNTER — APPOINTMENT (OUTPATIENT)
Dept: BARIATRICS/WEIGHT MGMT | Facility: CLINIC | Age: 44
End: 2024-06-11
Payer: COMMERCIAL

## 2024-06-11 VITALS — BODY MASS INDEX: 44.9 KG/M2 | HEIGHT: 64 IN | WEIGHT: 263 LBS

## 2024-06-11 DIAGNOSIS — I10 ESSENTIAL (PRIMARY) HYPERTENSION: ICD-10-CM

## 2024-06-11 PROCEDURE — G0463: CPT

## 2024-06-11 PROCEDURE — 99213 OFFICE O/P EST LOW 20 MIN: CPT | Mod: 95

## 2024-06-11 RX ORDER — TIRZEPATIDE 5 MG/.5ML
5 INJECTION, SOLUTION SUBCUTANEOUS
Qty: 3 | Refills: 1 | Status: ACTIVE | COMMUNITY
Start: 2024-06-11 | End: 1900-01-01

## 2024-06-11 NOTE — HISTORY OF PRESENT ILLNESS
[FreeTextEntry1] : This is a 43 year old female  being seen obesity followup visit.   Patient is down 7 pounds since last visit   Taking metformin 1000mg, on zepbound 2.5mg  walks 2 days a week, total of 3 miles a week otherwise sedentary  Feels food has been going well, eating more vegetables chicken and fish   sleeps well

## 2024-06-11 NOTE — ASSESSMENT
[FreeTextEntry1] : Bariatric surgery history: none Obesity co-morbidities: predm, hld Comorbidities improved or resolved: none Anti-obesity medications: metformin, zepbound  Obesity medication side effects: none  Plan: Focus on plant based, whole foods avoid package processed foods keep food journal  will send zepbound 5mg script to [facility]  encouraged increase physical activity. kenton gee  reviewed blood work  recommend vit d3 1000 units daily   f/u 4-6 weeks

## 2024-06-13 ENCOUNTER — OUTPATIENT (OUTPATIENT)
Dept: OUTPATIENT SERVICES | Facility: HOSPITAL | Age: 44
LOS: 1 days | End: 2024-06-13
Payer: COMMERCIAL

## 2024-06-13 ENCOUNTER — RESULT REVIEW (OUTPATIENT)
Age: 44
End: 2024-06-13

## 2024-06-13 ENCOUNTER — APPOINTMENT (OUTPATIENT)
Dept: ULTRASOUND IMAGING | Facility: IMAGING CENTER | Age: 44
End: 2024-06-13

## 2024-06-13 DIAGNOSIS — R92.2 INCONCLUSIVE MAMMOGRAM: ICD-10-CM

## 2024-06-13 DIAGNOSIS — Z00.8 ENCOUNTER FOR OTHER GENERAL EXAMINATION: ICD-10-CM

## 2024-06-13 PROCEDURE — 76642 ULTRASOUND BREAST LIMITED: CPT

## 2024-06-13 PROCEDURE — 76642 ULTRASOUND BREAST LIMITED: CPT | Mod: 26,RT

## 2024-06-14 DIAGNOSIS — R92.8 OTHER ABNORMAL AND INCONCLUSIVE FINDINGS ON DIAGNOSTIC IMAGING OF BREAST: ICD-10-CM

## 2024-06-18 DIAGNOSIS — E78.2 MIXED HYPERLIPIDEMIA: ICD-10-CM

## 2024-06-18 DIAGNOSIS — R73.03 PREDIABETES: ICD-10-CM

## 2024-06-18 DIAGNOSIS — E66.01 MORBID (SEVERE) OBESITY DUE TO EXCESS CALORIES: ICD-10-CM

## 2024-06-19 ENCOUNTER — APPOINTMENT (OUTPATIENT)
Dept: BARIATRICS/WEIGHT MGMT | Facility: CLINIC | Age: 44
End: 2024-06-19
Payer: COMMERCIAL

## 2024-06-19 PROCEDURE — 90832 PSYTX W PT 30 MINUTES: CPT | Mod: 95,GT

## 2024-06-19 NOTE — HISTORY OF PRESENT ILLNESS
[de-identified] : Pt's motivation for weight loss is to "feel healthy and comfortable."   Concerned about developing T2DM given paternal hx.  Reports gaining 40lbs x past 2 yrs. Per pt, her highest weight was 280-290 during high school and her lowest weight was 185-190 lbs in her early 20s.  She denies having a specific goal weight.

## 2024-06-19 NOTE — DISCUSSION/SUMMARY
[Conventional grooming and hygiene] : Conventional grooming and hygiene [Calm, cooperative] : Calm, cooperative [No unusual behaviors, movements, etc.] : No unusual behaviors, movements, etc. [Normal Rate/Tone/Volume] : Normal Rate/Tone/Volume [Normal Range] : Normal Range [Euthymic] : Euthymic [Logical, Goal Directed] : Logical, Goal Directed [Behavior plan developed] : Behavior plan developed [Addtnl Health & Behavior Intervention: Individual] : Additional Health and Behavior Intervention: Individual [Addtnl Health & Behavior Intervention: Group] : Additional Health and Behavior Intervention: Group [Coping skills training to overcome social/emotional barriers to adherence] : Coping skills training to overcome social/emotional barriers to adherence [de-identified] : good [de-identified] : excellent [de-identified] : hx of emotional and mindless eating [FreeTextEntry1] : Zulema is a 42 yr old female referred by Dr. Capellan for behavioral weight loss counseling.  She endorses emotional and mindless eating with overeating episodes that do not meet criteria for binge eating due to quantity of food; however, she endorses loss of control, eating rapidly, eating when not physically hungry and eating until uncomfortably full.  Previously in therapy with Josiane Enrique LCSW for tx of disordered eating.  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   [de-identified] : UFED   [FreeTextEntry4] : Follow up wither writer x 2 mths or sooner if needed. [de-identified] : self monitor food intake and contextual factors increase awareness of triggers for overeating

## 2024-06-19 NOTE — REASON FOR VISIT
[Follow-Up Visit] : a follow-up visit for [Morbid Obesity (BMI>40)] : morbid obesity (bmi>40) [Home] : at home, [unfilled] , at the time of the visit. [Other Location: e.g. Home (Enter Location, City,State)___] : at [unfilled] [Patient] : the patient [Referring By:  ___] : ~[address]~ was referred for psychological evaluation by Dr. VASQUEZ [de-identified] : for evaluation and tx of psyc sxs related to obesity [de-identified] : Confidentiality and its limitations were explained.

## 2024-07-22 ENCOUNTER — APPOINTMENT (OUTPATIENT)
Dept: BARIATRICS/WEIGHT MGMT | Facility: CLINIC | Age: 44
End: 2024-07-22
Payer: COMMERCIAL

## 2024-07-22 VITALS — WEIGHT: 260 LBS | BODY MASS INDEX: 44.39 KG/M2 | HEIGHT: 64 IN

## 2024-07-22 DIAGNOSIS — E66.01 MORBID (SEVERE) OBESITY DUE TO EXCESS CALORIES: ICD-10-CM

## 2024-07-22 DIAGNOSIS — R73.03 PREDIABETES.: ICD-10-CM

## 2024-07-22 DIAGNOSIS — E78.2 MIXED HYPERLIPIDEMIA: ICD-10-CM

## 2024-07-22 PROCEDURE — 99213 OFFICE O/P EST LOW 20 MIN: CPT | Mod: 95

## 2024-07-22 RX ORDER — TIRZEPATIDE 7.5 MG/.5ML
7.5 INJECTION, SOLUTION SUBCUTANEOUS
Qty: 4 | Refills: 3 | Status: ACTIVE | COMMUNITY
Start: 2024-07-22 | End: 1900-01-01

## 2024-07-22 NOTE — ASSESSMENT
[FreeTextEntry1] : Bariatric surgery history: none Obesity co-morbidities: predm, hld Comorbidities improved or resolved: none Anti-obesity medications: metformin, zepbound  Obesity medication side effects: none  Plan: Focus on plant based, whole foods avoid package processed foods keep food journal  increase  zepbound to 7.5mg, script to [facility]  decrease metformin to 500mg a day and then DC when she starts 7.5mg  encouraged increase physical activity. walk daily, 7+ steps daily    f/u 4-6 weeks

## 2024-07-22 NOTE — HISTORY OF PRESENT ILLNESS
[FreeTextEntry1] : This is a 43 year old female  being seen obesity followup visit.   Patient is down 3 mpounds  Taking metformin 1000mg, and zepbound 5mg tolerating 5mg well  on average 3k steps when she does not walk 7k when she is more active   When she is home she makes more mindful choices she cooks  feels she struggles with mindful eating when she is out  sleeps well

## 2024-08-06 ENCOUNTER — APPOINTMENT (OUTPATIENT)
Dept: BARIATRICS/WEIGHT MGMT | Facility: CLINIC | Age: 44
End: 2024-08-06

## 2024-08-06 PROCEDURE — 90832 PSYTX W PT 30 MINUTES: CPT | Mod: GT,95

## 2024-08-06 NOTE — DISCUSSION/SUMMARY
[Conventional grooming and hygiene] : Conventional grooming and hygiene [Calm, cooperative] : Calm, cooperative [No unusual behaviors, movements, etc.] : No unusual behaviors, movements, etc. [Normal Rate/Tone/Volume] : Normal Rate/Tone/Volume [Normal Range] : Normal Range [Euthymic] : Euthymic [Logical, Goal Directed] : Logical, Goal Directed [de-identified] : good [de-identified] : hx of emotional and mindless eating [de-identified] : excellent [Behavior plan developed] : Behavior plan developed [Addtnl Health & Behavior Intervention: Individual] : Additional Health and Behavior Intervention: Individual [Addtnl Health & Behavior Intervention: Group] : Additional Health and Behavior Intervention: Group [Coping skills training to overcome social/emotional barriers to adherence] : Coping skills training to overcome social/emotional barriers to adherence [FreeTextEntry1] : Zulema is a 42 yr old female referred by Dr. Capellan for behavioral weight loss counseling.  She endorses emotional and mindless eating with overeating episodes that do not meet criteria for binge eating due to quantity of food; however, she endorses loss of control, eating rapidly, eating when not physically hungry and eating until uncomfortably full.  Previously in therapy with Josiane Enrique LCSW for tx of disordered eating.  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   [de-identified] : UFED   [FreeTextEntry4] : Follow up wither writer x 2 mths or sooner if needed. [de-identified] : self monitor food intake and contextual factors increase awareness of triggers for overeating

## 2024-08-06 NOTE — REASON FOR VISIT
[Follow-Up Visit] : a follow-up visit for [Morbid Obesity (BMI>40)] : morbid obesity (bmi>40) [Home] : at home, [unfilled] , at the time of the visit. [Other Location: e.g. Home (Enter Location, City,State)___] : at [unfilled] [Patient] : the patient [This encounter was initiated by telehealth (audio with video) and converted to telephone (audio only) due to technical difficulties.] : This encounter was initiated by telehealth (audio with video) and converted to telephone (audio only) due to technical difficulties. [Referring By:  ___] : ~Paula Ln~ was referred for psychological evaluation by Dr. VASQUEZ [de-identified] : for evaluation and tx of psyc sxs related to obesity [de-identified] : Confidentiality and its limitations were explained.

## 2024-08-06 NOTE — HISTORY OF PRESENT ILLNESS
[de-identified] : Pt's motivation for weight loss is to "feel healthy and comfortable."   Concerned about developing T2DM given paternal hx.  Reports gaining 40lbs x past 2 yrs. Per pt, her highest weight was 280-290 during high school and her lowest weight was 185-190 lbs in her early 20s.  She denies having a specific goal weight.

## 2024-08-12 ENCOUNTER — APPOINTMENT (OUTPATIENT)
Dept: INTERNAL MEDICINE | Facility: CLINIC | Age: 44
End: 2024-08-12

## 2024-08-26 ENCOUNTER — APPOINTMENT (OUTPATIENT)
Dept: BARIATRICS/WEIGHT MGMT | Facility: CLINIC | Age: 44
End: 2024-08-26
Payer: COMMERCIAL

## 2024-08-26 ENCOUNTER — OUTPATIENT (OUTPATIENT)
Dept: OUTPATIENT SERVICES | Facility: HOSPITAL | Age: 44
LOS: 1 days | End: 2024-08-26
Payer: COMMERCIAL

## 2024-08-26 DIAGNOSIS — I10 ESSENTIAL (PRIMARY) HYPERTENSION: ICD-10-CM

## 2024-08-26 PROCEDURE — G0463: CPT

## 2024-08-26 PROCEDURE — G2211 COMPLEX E/M VISIT ADD ON: CPT | Mod: NC,95

## 2024-08-26 PROCEDURE — 99213 OFFICE O/P EST LOW 20 MIN: CPT | Mod: 95

## 2024-08-26 NOTE — HISTORY OF PRESENT ILLNESS
[FreeTextEntry1] : This is a 43 year old female  being seen obesity follow up visit.   Has not weighed herself, she has annual physical next week She has continued zepbound 5mg, tolerating well. Good energy levels  Plans to increase 7.5mg end of sept after she is done with last box of 5mg   Appetite well controlled  B: toast cheese and tomato L: cucumber with tuna  D: rice/potato, shrimp/chicken/beans and greens  sometimes nuts 8-9pm.   Travelling for work in 2 weeks to [facility] last week, walking and swimming used [employer] yesterday   7k steps a day on average   Sleeps well

## 2024-08-26 NOTE — ASSESSMENT
[FreeTextEntry1] : Bariatric surgery history: none Obesity co-morbidities: predm, hld Comorbidities improved or resolved: none Anti-obesity medications: metformin, zepbound Obesity medication side effects: none  Plan: Focus on plant based, whole foods avoid package processed foods keep food journal increase zepbound to 7.5mg  encouraged increase physical activity. walk daily, 7+ steps daily   f/u 6-8 weeks.

## 2024-09-03 PROBLEM — R92.2 INCONCLUSIVE MAMMOGRAM: Noted: 2024-06-10

## 2024-09-04 ENCOUNTER — APPOINTMENT (OUTPATIENT)
Dept: INTERNAL MEDICINE | Facility: CLINIC | Age: 44
End: 2024-09-04
Payer: COMMERCIAL

## 2024-09-04 ENCOUNTER — OUTPATIENT (OUTPATIENT)
Dept: OUTPATIENT SERVICES | Facility: HOSPITAL | Age: 44
LOS: 1 days | End: 2024-09-04
Payer: COMMERCIAL

## 2024-09-04 VITALS
OXYGEN SATURATION: 99 % | WEIGHT: 264 LBS | SYSTOLIC BLOOD PRESSURE: 142 MMHG | BODY MASS INDEX: 45.07 KG/M2 | DIASTOLIC BLOOD PRESSURE: 88 MMHG | HEIGHT: 64 IN | HEART RATE: 94 BPM

## 2024-09-04 VITALS — DIASTOLIC BLOOD PRESSURE: 82 MMHG | SYSTOLIC BLOOD PRESSURE: 122 MMHG

## 2024-09-04 DIAGNOSIS — E78.2 MIXED HYPERLIPIDEMIA: ICD-10-CM

## 2024-09-04 DIAGNOSIS — Z01.419 ENCOUNTER FOR GYNECOLOGICAL EXAMINATION (GENERAL) (ROUTINE) W/OUT ABNORMAL FINDINGS: ICD-10-CM

## 2024-09-04 DIAGNOSIS — R31.29 OTHER MICROSCOPIC HEMATURIA: ICD-10-CM

## 2024-09-04 DIAGNOSIS — R73.03 PREDIABETES.: ICD-10-CM

## 2024-09-04 DIAGNOSIS — R92.8 OTHER ABNORMAL AND INCONCLUSIVE FINDINGS ON DIAGNOSTIC IMAGING OF BREAST: ICD-10-CM

## 2024-09-04 DIAGNOSIS — I10 ESSENTIAL (PRIMARY) HYPERTENSION: ICD-10-CM

## 2024-09-04 DIAGNOSIS — E66.01 MORBID (SEVERE) OBESITY DUE TO EXCESS CALORIES: ICD-10-CM

## 2024-09-04 DIAGNOSIS — M25.531 PAIN IN RIGHT WRIST: ICD-10-CM

## 2024-09-04 DIAGNOSIS — R63.2 POLYPHAGIA: ICD-10-CM

## 2024-09-04 DIAGNOSIS — Z00.00 ENCOUNTER FOR GENERAL ADULT MEDICAL EXAMINATION W/OUT ABNORMAL FINDINGS: ICD-10-CM

## 2024-09-04 DIAGNOSIS — R73.03 PREDIABETES: ICD-10-CM

## 2024-09-04 DIAGNOSIS — Z87.42 PERSONAL HISTORY OF OTHER DISEASES OF THE FEMALE GENITAL TRACT: ICD-10-CM

## 2024-09-04 DIAGNOSIS — R92.2 INCONCLUSIVE MAMMOGRAM: ICD-10-CM

## 2024-09-04 PROCEDURE — G0463: CPT

## 2024-09-04 PROCEDURE — 99396 PREV VISIT EST AGE 40-64: CPT

## 2024-09-04 NOTE — PHYSICAL EXAM
[Normal Oropharynx] : the oropharynx was normal [Normal TMs] : both tympanic membranes were normal [Normal] : normal rate, regular rhythm, normal S1 and S2 and no murmur heard [No Edema] : there was no peripheral edema [Soft] : abdomen soft [Non Tender] : non-tender [Non-distended] : non-distended [No Rash] : no rash [Normal Gait] : normal gait [Normal Affect] : the affect was normal [Normal Insight/Judgement] : insight and judgment were intact

## 2024-09-04 NOTE — HISTORY OF PRESENT ILLNESS
[FreeTextEntry1] : Establish Care  [de-identified] : 43-year-old female history of obesity, prediabetes, right breast nodule presenting to establish care.   Patient feels well today without complaints.   #Obesity  Currently part of our weight management clinic. Taking Zepbound 5mg with plans to increase to 7.5mg weekly in October. Losing weight consistently. Exercising and improving her diet but still room to improve when it comes to exercise and will work on that. Plan to repeat lipid and A1C in December, patient given scripts.   #Right breast nodule  Mammo/US June 2024 Birads 3 right breast nodule. Due for repeat in six months (Dec 2024). Scripts already in patient's chart and patient aware. Says it is marked on her calendar.   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 Patient has an apartment but most nights stays with her mother. She has no siblings and her parents are . Not in a relationship. Has cousins and an uncle who offer some support. Is able to manage the stress although it is stressful. Would like to help her mother stay in her house as long as possible. She does have hired aides and help for when she needs to leave the house or go out of town.   HCM: Mammo/US June 2024 Birads 3 right breast nodule- repeat in six months (Dec 2024)  Pap/HPV normal 2022 (due 2027) Vaccines: UTD

## 2024-09-04 NOTE — HEALTH RISK ASSESSMENT
[0] : 2) Feeling down, depressed, or hopeless: Not at all (0) [PHQ-2 Negative - No further assessment needed] : PHQ-2 Negative - No further assessment needed [2 - 4 times a month (2 pts)] : 2-4 times a month (2 points) [1 or 2 (0 pts)] : 1 or 2 (0 points) [Never (0 pts)] : Never (0 points) [No] : In the past 12 months have you used drugs other than those required for medical reasons? 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

## 2024-09-04 NOTE — HEALTH RISK ASSESSMENT
[0] : 2) Feeling down, depressed, or hopeless: Not at all (0) [PHQ-2 Negative - No further assessment needed] : PHQ-2 Negative - No further assessment needed [2 - 4 times a month (2 pts)] : 2-4 times a month (2 points) [1 or 2 (0 pts)] : 1 or 2 (0 points) [Never (0 pts)] : Never (0 points) [No] : In the past 12 months have you used drugs other than those required for medical reasons? 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

## 2024-09-04 NOTE — ASSESSMENT
[FreeTextEntry1] : 43-year-old female history of obesity, prediabetes, right breast nodule presenting to establish care.  #Obesity, prediabetes, hyperlipidemia  - c/w Zepbound 5mg with plans to increase to 7.5mg weekly in October.  - encouraged exercise and healthy diet  - will recheck lipid and A1C in December, patient given scripts  Losing weight consistently. Exercising and improving her diet but still room to improve when it comes to exercise and will work on that. Plan to repeat lipid and A1C in December, patient given scripts.  #Right breast nodule - Mammo/US June 2024 Birads 3 right breast nodule. - Due for repeat in six months (Dec 2024).   HCM: Mammo/US June 2024 Birads 3 right breast nodule- repeat in six months (Dec 2024) Pap/HPV normal 2022 (due 2027) Vaccines: UTD  RTC as needed or for next CPE   Kirsten Lara MD

## 2024-09-04 NOTE — HISTORY OF PRESENT ILLNESS
[FreeTextEntry1] : Establish Care  [de-identified] : 43-year-old female history of obesity, prediabetes, right breast nodule presenting to establish care.   Patient feels well today without complaints.   #Obesity  Currently part of our weight management clinic. Taking Zepbound 5mg with plans to increase to 7.5mg weekly in October. Losing weight consistently. Exercising and improving her diet but still room to improve when it comes to exercise and will work on that. Plan to repeat lipid and A1C in December, patient given scripts.   #Right breast nodule  Mammo/US June 2024 Birads 3 right breast nodule. Due for repeat in six months (Dec 2024). Scripts already in patient's chart and patient aware. Says it is marked on her calendar.   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 Patient has an apartment but most nights stays with her mother. She has no siblings and her parents are . Not in a relationship. Has cousins and an uncle who offer some support. Is able to manage the stress although it is stressful. Would like to help her mother stay in her house as long as possible. She does have hired aides and help for when she needs to leave the house or go out of town.   HCM: Mammo/US June 2024 Birads 3 right breast nodule- repeat in six months (Dec 2024)  Pap/HPV normal 2022 (due 2027) Vaccines: UTD

## 2024-09-09 DIAGNOSIS — E78.2 MIXED HYPERLIPIDEMIA: ICD-10-CM

## 2024-09-09 DIAGNOSIS — R73.03 PREDIABETES: ICD-10-CM

## 2024-09-09 DIAGNOSIS — E66.01 MORBID (SEVERE) OBESITY DUE TO EXCESS CALORIES: ICD-10-CM

## 2024-09-09 DIAGNOSIS — Z00.00 ENCOUNTER FOR GENERAL ADULT MEDICAL EXAMINATION WITHOUT ABNORMAL FINDINGS: ICD-10-CM

## 2024-09-09 DIAGNOSIS — R92.8 OTHER ABNORMAL AND INCONCLUSIVE FINDINGS ON DIAGNOSTIC IMAGING OF BREAST: ICD-10-CM

## 2024-10-07 ENCOUNTER — OUTPATIENT (OUTPATIENT)
Dept: OUTPATIENT SERVICES | Facility: HOSPITAL | Age: 44
LOS: 1 days | End: 2024-10-07
Payer: COMMERCIAL

## 2024-10-07 ENCOUNTER — APPOINTMENT (OUTPATIENT)
Dept: BARIATRICS/WEIGHT MGMT | Facility: CLINIC | Age: 44
End: 2024-10-07
Payer: COMMERCIAL

## 2024-10-07 VITALS — WEIGHT: 255 LBS | BODY MASS INDEX: 43.54 KG/M2 | HEIGHT: 64 IN

## 2024-10-07 DIAGNOSIS — E78.2 MIXED HYPERLIPIDEMIA: ICD-10-CM

## 2024-10-07 DIAGNOSIS — I10 ESSENTIAL (PRIMARY) HYPERTENSION: ICD-10-CM

## 2024-10-07 DIAGNOSIS — R73.03 PREDIABETES.: ICD-10-CM

## 2024-10-07 DIAGNOSIS — E66.01 MORBID (SEVERE) OBESITY DUE TO EXCESS CALORIES: ICD-10-CM

## 2024-10-07 PROCEDURE — G0463: CPT

## 2024-10-07 PROCEDURE — G2211 COMPLEX E/M VISIT ADD ON: CPT | Mod: 95

## 2024-10-07 PROCEDURE — 99213 OFFICE O/P EST LOW 20 MIN: CPT | Mod: 95

## 2024-10-14 DIAGNOSIS — E78.2 MIXED HYPERLIPIDEMIA: ICD-10-CM

## 2024-10-14 DIAGNOSIS — R73.03 PREDIABETES: ICD-10-CM

## 2024-10-14 DIAGNOSIS — E66.01 MORBID (SEVERE) OBESITY DUE TO EXCESS CALORIES: ICD-10-CM

## 2024-10-21 ENCOUNTER — APPOINTMENT (OUTPATIENT)
Dept: BARIATRICS/WEIGHT MGMT | Facility: CLINIC | Age: 44
End: 2024-10-21
Payer: COMMERCIAL

## 2024-10-21 VITALS — BODY MASS INDEX: 43.54 KG/M2 | HEIGHT: 64 IN | WEIGHT: 255 LBS

## 2024-10-21 PROCEDURE — 90832 PSYTX W PT 30 MINUTES: CPT | Mod: GT,95

## 2024-12-02 ENCOUNTER — APPOINTMENT (OUTPATIENT)
Dept: BARIATRICS/WEIGHT MGMT | Facility: CLINIC | Age: 44
End: 2024-12-02
Payer: COMMERCIAL

## 2024-12-02 ENCOUNTER — OUTPATIENT (OUTPATIENT)
Dept: OUTPATIENT SERVICES | Facility: HOSPITAL | Age: 44
LOS: 1 days | End: 2024-12-02
Payer: COMMERCIAL

## 2024-12-02 VITALS — HEIGHT: 64 IN | WEIGHT: 250.56 LBS | BODY MASS INDEX: 42.78 KG/M2

## 2024-12-02 DIAGNOSIS — R73.03 PREDIABETES.: ICD-10-CM

## 2024-12-02 DIAGNOSIS — I10 ESSENTIAL (PRIMARY) HYPERTENSION: ICD-10-CM

## 2024-12-02 DIAGNOSIS — E66.01 MORBID (SEVERE) OBESITY DUE TO EXCESS CALORIES: ICD-10-CM

## 2024-12-02 DIAGNOSIS — E78.2 MIXED HYPERLIPIDEMIA: ICD-10-CM

## 2024-12-02 PROCEDURE — G0463: CPT

## 2024-12-02 PROCEDURE — 99213 OFFICE O/P EST LOW 20 MIN: CPT | Mod: 95

## 2024-12-02 PROCEDURE — G2211 COMPLEX E/M VISIT ADD ON: CPT | Mod: 95

## 2024-12-09 ENCOUNTER — APPOINTMENT (OUTPATIENT)
Dept: BARIATRICS/WEIGHT MGMT | Facility: CLINIC | Age: 44
End: 2024-12-09
Payer: COMMERCIAL

## 2024-12-09 DIAGNOSIS — E66.01 MORBID (SEVERE) OBESITY DUE TO EXCESS CALORIES: ICD-10-CM

## 2024-12-09 DIAGNOSIS — E78.2 MIXED HYPERLIPIDEMIA: ICD-10-CM

## 2024-12-09 DIAGNOSIS — R73.03 PREDIABETES: ICD-10-CM

## 2024-12-09 PROCEDURE — 90832 PSYTX W PT 30 MINUTES: CPT | Mod: GT,95

## 2024-12-10 RX ORDER — TIRZEPATIDE 10 MG/.5ML
10 INJECTION, SOLUTION SUBCUTANEOUS
Qty: 3 | Refills: 1 | Status: ACTIVE | COMMUNITY
Start: 2024-12-02 | End: 1900-01-01

## 2025-01-02 ENCOUNTER — APPOINTMENT (OUTPATIENT)
Dept: ULTRASOUND IMAGING | Facility: IMAGING CENTER | Age: 45
End: 2025-01-02
Payer: COMMERCIAL

## 2025-01-02 ENCOUNTER — RESULT REVIEW (OUTPATIENT)
Age: 45
End: 2025-01-02

## 2025-01-02 ENCOUNTER — APPOINTMENT (OUTPATIENT)
Dept: MAMMOGRAPHY | Facility: IMAGING CENTER | Age: 45
End: 2025-01-02
Payer: COMMERCIAL

## 2025-01-02 ENCOUNTER — OUTPATIENT (OUTPATIENT)
Dept: OUTPATIENT SERVICES | Facility: HOSPITAL | Age: 45
LOS: 1 days | End: 2025-01-02
Payer: COMMERCIAL

## 2025-01-02 DIAGNOSIS — R92.8 OTHER ABNORMAL AND INCONCLUSIVE FINDINGS ON DIAGNOSTIC IMAGING OF BREAST: ICD-10-CM

## 2025-01-02 DIAGNOSIS — Z00.8 ENCOUNTER FOR OTHER GENERAL EXAMINATION: ICD-10-CM

## 2025-01-02 PROCEDURE — 77065 DX MAMMO INCL CAD UNI: CPT | Mod: 26,RT

## 2025-01-02 PROCEDURE — G0279: CPT | Mod: 26

## 2025-01-02 PROCEDURE — 76642 ULTRASOUND BREAST LIMITED: CPT

## 2025-01-02 PROCEDURE — 77065 DX MAMMO INCL CAD UNI: CPT

## 2025-01-02 PROCEDURE — G0279: CPT

## 2025-01-02 PROCEDURE — 76642 ULTRASOUND BREAST LIMITED: CPT | Mod: 26,RT

## 2025-01-07 ENCOUNTER — OUTPATIENT (OUTPATIENT)
Dept: OUTPATIENT SERVICES | Facility: HOSPITAL | Age: 45
LOS: 1 days | End: 2025-01-07

## 2025-01-07 ENCOUNTER — APPOINTMENT (OUTPATIENT)
Dept: BARIATRICS/WEIGHT MGMT | Facility: CLINIC | Age: 45
End: 2025-01-07
Payer: COMMERCIAL

## 2025-01-07 VITALS — BODY MASS INDEX: 42.34 KG/M2 | HEIGHT: 64 IN | WEIGHT: 248 LBS

## 2025-01-07 DIAGNOSIS — E78.2 MIXED HYPERLIPIDEMIA: ICD-10-CM

## 2025-01-07 DIAGNOSIS — I10 ESSENTIAL (PRIMARY) HYPERTENSION: ICD-10-CM

## 2025-01-07 DIAGNOSIS — R73.03 PREDIABETES.: ICD-10-CM

## 2025-01-07 DIAGNOSIS — E66.01 MORBID (SEVERE) OBESITY DUE TO EXCESS CALORIES: ICD-10-CM

## 2025-01-07 PROCEDURE — 99213 OFFICE O/P EST LOW 20 MIN: CPT | Mod: 95

## 2025-01-07 PROCEDURE — G2211 COMPLEX E/M VISIT ADD ON: CPT | Mod: 95

## 2025-02-04 ENCOUNTER — APPOINTMENT (OUTPATIENT)
Dept: BARIATRICS/WEIGHT MGMT | Facility: CLINIC | Age: 45
End: 2025-02-04
Payer: COMMERCIAL

## 2025-02-04 VITALS — HEIGHT: 64 IN | WEIGHT: 248 LBS | BODY MASS INDEX: 42.34 KG/M2

## 2025-02-04 PROCEDURE — 90832 PSYTX W PT 30 MINUTES: CPT | Mod: 95

## 2025-03-12 ENCOUNTER — APPOINTMENT (OUTPATIENT)
Dept: BARIATRICS/WEIGHT MGMT | Facility: CLINIC | Age: 45
End: 2025-03-12
Payer: COMMERCIAL

## 2025-03-12 VITALS — HEIGHT: 64 IN | WEIGHT: 244.5 LBS | BODY MASS INDEX: 41.74 KG/M2

## 2025-03-12 PROCEDURE — 90832 PSYTX W PT 30 MINUTES: CPT | Mod: 95

## 2025-05-12 ENCOUNTER — NON-APPOINTMENT (OUTPATIENT)
Age: 45
End: 2025-05-12

## 2025-05-13 ENCOUNTER — NON-APPOINTMENT (OUTPATIENT)
Age: 45
End: 2025-05-13

## 2025-05-14 ENCOUNTER — APPOINTMENT (OUTPATIENT)
Dept: BARIATRICS/WEIGHT MGMT | Facility: CLINIC | Age: 45
End: 2025-05-14
Payer: COMMERCIAL

## 2025-05-14 ENCOUNTER — OUTPATIENT (OUTPATIENT)
Dept: OUTPATIENT SERVICES | Facility: HOSPITAL | Age: 45
LOS: 1 days | End: 2025-05-14

## 2025-05-14 VITALS — BODY MASS INDEX: 40.8 KG/M2 | WEIGHT: 239 LBS | HEIGHT: 64 IN

## 2025-05-14 DIAGNOSIS — E78.2 MIXED HYPERLIPIDEMIA: ICD-10-CM

## 2025-05-14 DIAGNOSIS — E66.01 MORBID (SEVERE) OBESITY DUE TO EXCESS CALORIES: ICD-10-CM

## 2025-05-14 DIAGNOSIS — I10 ESSENTIAL (PRIMARY) HYPERTENSION: ICD-10-CM

## 2025-05-14 DIAGNOSIS — R73.03 PREDIABETES.: ICD-10-CM

## 2025-05-14 PROCEDURE — 99213 OFFICE O/P EST LOW 20 MIN: CPT | Mod: 95

## 2025-05-14 RX ORDER — TIRZEPATIDE 12.5 MG/.5ML
12.5 INJECTION, SOLUTION SUBCUTANEOUS
Qty: 1 | Refills: 0 | Status: ACTIVE | COMMUNITY
Start: 2025-05-14 | End: 1900-01-01

## 2025-05-15 ENCOUNTER — APPOINTMENT (OUTPATIENT)
Dept: OBGYN | Facility: CLINIC | Age: 45
End: 2025-05-15
Payer: COMMERCIAL

## 2025-05-15 VITALS
SYSTOLIC BLOOD PRESSURE: 126 MMHG | HEIGHT: 64 IN | WEIGHT: 243.13 LBS | BODY MASS INDEX: 41.51 KG/M2 | DIASTOLIC BLOOD PRESSURE: 83 MMHG

## 2025-05-15 DIAGNOSIS — R92.8 OTHER ABNORMAL AND INCONCLUSIVE FINDINGS ON DIAGNOSTIC IMAGING OF BREAST: ICD-10-CM

## 2025-05-15 DIAGNOSIS — Z01.419 ENCOUNTER FOR GYNECOLOGICAL EXAMINATION (GENERAL) (ROUTINE) W/OUT ABNORMAL FINDINGS: ICD-10-CM

## 2025-05-15 PROCEDURE — G0444 DEPRESSION SCREEN ANNUAL: CPT | Mod: 59

## 2025-05-15 PROCEDURE — 99396 PREV VISIT EST AGE 40-64: CPT

## 2025-05-27 ENCOUNTER — APPOINTMENT (OUTPATIENT)
Dept: BARIATRICS/WEIGHT MGMT | Facility: CLINIC | Age: 45
End: 2025-05-27
Payer: COMMERCIAL

## 2025-05-27 PROCEDURE — 90832 PSYTX W PT 30 MINUTES: CPT | Mod: 95

## 2025-06-19 ENCOUNTER — OUTPATIENT (OUTPATIENT)
Dept: OUTPATIENT SERVICES | Facility: HOSPITAL | Age: 45
LOS: 1 days | End: 2025-06-19

## 2025-06-19 ENCOUNTER — APPOINTMENT (OUTPATIENT)
Dept: BARIATRICS/WEIGHT MGMT | Facility: CLINIC | Age: 45
End: 2025-06-19
Payer: COMMERCIAL

## 2025-06-19 VITALS — BODY MASS INDEX: 40.46 KG/M2 | HEIGHT: 64 IN | WEIGHT: 237 LBS

## 2025-06-19 DIAGNOSIS — I10 ESSENTIAL (PRIMARY) HYPERTENSION: ICD-10-CM

## 2025-06-19 PROCEDURE — 99213 OFFICE O/P EST LOW 20 MIN: CPT | Mod: 95

## 2025-06-26 DIAGNOSIS — R73.03 PREDIABETES: ICD-10-CM

## 2025-06-26 DIAGNOSIS — E66.01 MORBID (SEVERE) OBESITY DUE TO EXCESS CALORIES: ICD-10-CM

## 2025-06-26 DIAGNOSIS — E78.2 MIXED HYPERLIPIDEMIA: ICD-10-CM

## 2025-06-27 ENCOUNTER — APPOINTMENT (OUTPATIENT)
Dept: MAMMOGRAPHY | Facility: CLINIC | Age: 45
End: 2025-06-27
Payer: COMMERCIAL

## 2025-06-27 ENCOUNTER — OUTPATIENT (OUTPATIENT)
Dept: OUTPATIENT SERVICES | Facility: HOSPITAL | Age: 45
LOS: 1 days | End: 2025-06-27
Payer: COMMERCIAL

## 2025-06-27 ENCOUNTER — RESULT REVIEW (OUTPATIENT)
Age: 45
End: 2025-06-27

## 2025-06-27 ENCOUNTER — APPOINTMENT (OUTPATIENT)
Dept: ULTRASOUND IMAGING | Facility: CLINIC | Age: 45
End: 2025-06-27
Payer: COMMERCIAL

## 2025-06-27 DIAGNOSIS — R92.8 OTHER ABNORMAL AND INCONCLUSIVE FINDINGS ON DIAGNOSTIC IMAGING OF BREAST: ICD-10-CM

## 2025-06-27 DIAGNOSIS — Z01.419 ENCOUNTER FOR GYNECOLOGICAL EXAMINATION (GENERAL) (ROUTINE) WITHOUT ABNORMAL FINDINGS: ICD-10-CM

## 2025-06-27 PROCEDURE — 77066 DX MAMMO INCL CAD BI: CPT | Mod: 26

## 2025-06-27 PROCEDURE — 76641 ULTRASOUND BREAST COMPLETE: CPT | Mod: 26,RT

## 2025-06-27 PROCEDURE — G0279: CPT | Mod: 26

## 2025-06-27 PROCEDURE — 77066 DX MAMMO INCL CAD BI: CPT

## 2025-06-27 PROCEDURE — 76641 ULTRASOUND BREAST COMPLETE: CPT

## 2025-06-27 PROCEDURE — G0279: CPT

## 2025-07-01 ENCOUNTER — APPOINTMENT (OUTPATIENT)
Dept: BARIATRICS/WEIGHT MGMT | Facility: CLINIC | Age: 45
End: 2025-07-01
Payer: COMMERCIAL

## 2025-07-01 PROCEDURE — 90832 PSYTX W PT 30 MINUTES: CPT | Mod: 95

## 2025-08-25 DIAGNOSIS — E55.9 VITAMIN D DEFICIENCY, UNSPECIFIED: ICD-10-CM

## 2025-09-04 ENCOUNTER — LABORATORY RESULT (OUTPATIENT)
Age: 45
End: 2025-09-04

## 2025-09-04 DIAGNOSIS — D75.839 THROMBOCYTOSIS, UNSPECIFIED: ICD-10-CM

## 2025-09-05 ENCOUNTER — OUTPATIENT (OUTPATIENT)
Dept: OUTPATIENT SERVICES | Facility: HOSPITAL | Age: 45
LOS: 1 days | End: 2025-09-05
Payer: COMMERCIAL

## 2025-09-05 ENCOUNTER — APPOINTMENT (OUTPATIENT)
Dept: INTERNAL MEDICINE | Facility: CLINIC | Age: 45
End: 2025-09-05
Payer: COMMERCIAL

## 2025-09-05 VITALS
OXYGEN SATURATION: 97 % | WEIGHT: 245 LBS | HEART RATE: 82 BPM | DIASTOLIC BLOOD PRESSURE: 86 MMHG | SYSTOLIC BLOOD PRESSURE: 120 MMHG | HEIGHT: 64 IN | BODY MASS INDEX: 41.83 KG/M2

## 2025-09-05 DIAGNOSIS — I10 ESSENTIAL (PRIMARY) HYPERTENSION: ICD-10-CM

## 2025-09-05 DIAGNOSIS — Z00.00 ENCOUNTER FOR GENERAL ADULT MEDICAL EXAMINATION W/OUT ABNORMAL FINDINGS: ICD-10-CM

## 2025-09-05 DIAGNOSIS — R73.03 PREDIABETES.: ICD-10-CM

## 2025-09-05 LAB
25(OH)D3 SERPL-MCNC: 23 NG/ML
ALBUMIN SERPL ELPH-MCNC: 4.2 G/DL
ALP BLD-CCNC: 76 U/L
ALT SERPL-CCNC: 21 U/L
ANION GAP SERPL CALC-SCNC: 11 MMOL/L
AST SERPL-CCNC: 15 U/L
BILIRUB SERPL-MCNC: 0.3 MG/DL
BUN SERPL-MCNC: 13 MG/DL
CALCIUM SERPL-MCNC: 9.1 MG/DL
CHLORIDE SERPL-SCNC: 103 MMOL/L
CHOLEST SERPL-MCNC: 245 MG/DL
CO2 SERPL-SCNC: 25 MMOL/L
CREAT SERPL-MCNC: 0.75 MG/DL
EGFRCR SERPLBLD CKD-EPI 2021: 101 ML/MIN/1.73M2
ESTIMATED AVERAGE GLUCOSE: 108 MG/DL
GLUCOSE SERPL-MCNC: 96 MG/DL
HBA1C MFR BLD HPLC: 5.4 %
HCT VFR BLD CALC: 42.2 %
HDLC SERPL-MCNC: 48 MG/DL
HGB BLD-MCNC: 12.9 G/DL
LDLC SERPL-MCNC: 176 MG/DL
MCHC RBC-ENTMCNC: 23.7 PG
MCHC RBC-ENTMCNC: 30.6 G/DL
MCV RBC AUTO: 77.6 FL
NONHDLC SERPL-MCNC: 197 MG/DL
PLATELET # BLD AUTO: 424 K/UL
POTASSIUM SERPL-SCNC: 4.6 MMOL/L
PROT SERPL-MCNC: 6.9 G/DL
RBC # BLD: 5.44 M/UL
RBC # FLD: 15.7 %
SODIUM SERPL-SCNC: 139 MMOL/L
TRIGL SERPL-MCNC: 117 MG/DL
TSH SERPL-ACNC: 1.91 UIU/ML
WBC # FLD AUTO: 6.67 K/UL

## 2025-09-05 PROCEDURE — 99396 PREV VISIT EST AGE 40-64: CPT

## 2025-09-05 PROCEDURE — G0463: CPT

## 2025-09-09 ENCOUNTER — APPOINTMENT (OUTPATIENT)
Dept: BARIATRICS/WEIGHT MGMT | Facility: CLINIC | Age: 45
End: 2025-09-09
Payer: COMMERCIAL

## 2025-09-09 VITALS — WEIGHT: 237 LBS | HEIGHT: 64 IN | BODY MASS INDEX: 40.46 KG/M2

## 2025-09-09 DIAGNOSIS — E66.01 MORBID (SEVERE) OBESITY DUE TO EXCESS CALORIES: ICD-10-CM

## 2025-09-09 DIAGNOSIS — E78.2 MIXED HYPERLIPIDEMIA: ICD-10-CM

## 2025-09-09 PROCEDURE — 99213 OFFICE O/P EST LOW 20 MIN: CPT | Mod: 95

## 2025-09-09 PROCEDURE — 90832 PSYTX W PT 30 MINUTES: CPT | Mod: 95

## 2025-09-17 DIAGNOSIS — Z00.00 ENCOUNTER FOR GENERAL ADULT MEDICAL EXAMINATION WITHOUT ABNORMAL FINDINGS: ICD-10-CM

## 2025-09-17 DIAGNOSIS — E66.01 MORBID (SEVERE) OBESITY DUE TO EXCESS CALORIES: ICD-10-CM
